# Patient Record
Sex: MALE | Race: WHITE
[De-identification: names, ages, dates, MRNs, and addresses within clinical notes are randomized per-mention and may not be internally consistent; named-entity substitution may affect disease eponyms.]

---

## 2017-05-19 ENCOUNTER — HOSPITAL ENCOUNTER (EMERGENCY)
Dept: HOSPITAL 61 - ER | Age: 67
Discharge: HOME | End: 2017-05-19
Payer: COMMERCIAL

## 2017-05-19 VITALS
DIASTOLIC BLOOD PRESSURE: 89 MMHG | SYSTOLIC BLOOD PRESSURE: 140 MMHG | SYSTOLIC BLOOD PRESSURE: 140 MMHG | DIASTOLIC BLOOD PRESSURE: 89 MMHG | DIASTOLIC BLOOD PRESSURE: 89 MMHG | SYSTOLIC BLOOD PRESSURE: 140 MMHG

## 2017-05-19 DIAGNOSIS — E11.9: ICD-10-CM
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
## 2017-05-19 DIAGNOSIS — I10: ICD-10-CM

## 2017-05-19 DIAGNOSIS — M25.551: Primary | ICD-10-CM

## 2017-05-19 DIAGNOSIS — M19.90: ICD-10-CM
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
## 2017-05-19 PROCEDURE — 73502 X-RAY EXAM HIP UNI 2-3 VIEWS: CPT

## 2017-05-19 NOTE — PHYS DOC
Past Medical History


Past Medical History:  Arthritis, Cancer, Diabetes-Type II, Hypertension


Additional Past Medical Histor:  LEFT MASTECTOMY


Past Surgical History:  Cholecystectomy


Additional Past Surgical Histo:  LEFT ELBOW SURGERY


Alcohol Use:  None


Drug Use:  None





Adult General


Chief Complaint


Chief Complaint:  MECHANICAL FALL





HPI


HPI





Patient is a 66  year old  male who presents with right hip pain. Last 

night he tripped over a sprayer and landed on his right hip. He states his been 

able to ambulate makes it difficult for him to flex at his hip. He denies 

fevers chills nausea or vomiting. He has pain meds at home of oxycodone and 

just states he wants to know if his hip is broken. Eyes any other injuries such 

as headache, neck pain, chest pain or abdominal pain.





Review of Systems


Review of Systems





Constitutional: Denies fever or chills []


Eyes: Denies change in visual acuity, redness, or eye pain []


HENT: Denies nasal congestion or sore throat []


Respiratory: Denies cough or shortness of breath []


Cardiovascular: No additional information not addressed in HPI []


GI: Denies abdominal pain, nausea, vomiting, bloody stools or diarrhea []


: Denies dysuria or hematuria []


Musculoskeletal: Denies back pain, pain in the right hip


Integument: Denies rash or skin lesions []


Neurologic: Denies headache, focal weakness or sensory changes []


Endocrine: Denies polyuria or polydipsia []





Allergies


Allergies





Allergies








Coded Allergies Type Severity Reaction Last Updated Verified


 


  No Known Drug Allergies    16 No











Physical Exam


Physical Exam





Constitutional: Well developed, well nourished, no acute distress, non-toxic 

appearance. []


HENT: Normocephalic, atraumatic, bilateral external ears normal, oropharynx 

moist, no oral exudates, nose normal. []


Eyes: PERRLA, EOMI, conjunctiva normal, no discharge. [] 


Neck: Normal range of motion, no tenderness, supple, no stridor. [] 


Cardiovascular:Heart rate regular rhythm, no murmur []


Lungs & Thorax:  Bilateral breath sounds clear to auscultation []


Abdomen: Bowel sounds normal, soft, no tenderness, no masses, no pulsatile 

masses. [] 


Skin: Warm, dry, no erythema, no rash. [] 


Back: No tenderness, no CVA tenderness. [] 


Extremities: Tender to palpation the posterior right hip without any step-offs, 

able to flex and extend with minimal discomfort of the right hip, no cyanosis, 

no clubbing, ROM intact, no edema left hip nontender full range of motion, 

dorsal pedis pulse intact on the right lower show many. [] 


Neurologic: Alert and oriented X 3, normal motor function, normal sensory 

function, no focal deficits noted. []


Psychologic: Affect normal, judgement normal, mood normal. []





Current Patient Data


Vital Signs





 Vital Signs








  Date Time  Temp Pulse Resp B/P (MAP) Pulse Ox O2 Delivery O2 Flow Rate FiO2


 


17 12:56 98.4 115 20  95 Room Air  





 98.4       











EKG


EKG


[]





Radiology/Procedures


Radiology/Procedures


 [facility]


 [address] [place] [address]


 [phone]


 


 IMAGING REPORT





 Signed





PATIENT: MARKIE FRANCO ACCOUNT: [id number] MRN#: [id number]


: 1950 LOCATION: ER AGE: 66


SEX: M EXAM DT: 17 ACCESSION#: [id number]


STATUS: PRE ER ORD. PHYSICIAN: KATARINA DAWN MD 


REASON: pain


PROCEDURE: HIP RIGHT 2V WITH PELVIS





Pelvis with right hip, 3 views, 2017:





History: Fall, hip pain





No fracture or dislocation is identified. The hip joint spaces are fairly


well-preserved with only mild marginal spurring. Arterial calcifications are


present.





IMPRESSION: No acute bony abnormality is detected.














DICTATED and SIGNED BY:     MORITZ,RICK S MD


DATE:     17 6548





CC: KATARINA DAWN MD; ROSA ELENA VIEYRA Jr, MD ~


Impressions:


Right hip contusion





Course & Med Decision Making


Course & Med Decision Making


Pertinent Labs and Imaging studies reviewed. (See chart for details)





X-rays do not show any fractures of the pelvis or hip. Patient has pain meds at 

home and crutches. He is being discharged in stable condition to follow up with 

primary care physician if his pain does not resolve over the next week. Return 

precautions given and is agreeable to the plan and being discharged in stable 

condition.





Dragon Disclaimer


Dragon Disclaimer


This electronic medical record was generated, in whole or in part, using a 

voice recognition dictation system.





Departure


Departure


Impression:  


 Primary Impression:  


 Hip pain


Disposition:  01 HOME, SELF-CARE


Condition:  STABLE


Referrals:  


ROSA ELENA VIEYRA Jr, MD (PCP)


Patient Instructions:  Contusion, Easy-to-Read





Additional Instructions:  


The x-rays of your hip did not show anything broken. It's likely contused. It 

will be sore for the next few days. Follow-up with her primary care physician 

within the next week if you're still in pain or discomfort. There is severe 

pain or discomfort please return back to ER.











KATARINA DAWN MD May 19, 2017 13:04

## 2017-05-19 NOTE — RAD
Pelvis with right hip, 3 views, 5/19/2017:



History: Fall, hip pain



No fracture or dislocation is identified. The hip joint spaces are fairly

well-preserved with only mild marginal spurring. Arterial calcifications are

present.



IMPRESSION: No acute bony abnormality is detected.

## 2019-06-05 ENCOUNTER — HOSPITAL ENCOUNTER (OUTPATIENT)
Dept: HOSPITAL 61 - MRI | Age: 69
Discharge: HOME | End: 2019-06-05
Attending: PHYSICAL MEDICINE & REHABILITATION
Payer: MEDICARE

## 2019-06-05 DIAGNOSIS — M89.38: ICD-10-CM

## 2019-06-05 DIAGNOSIS — M48.061: ICD-10-CM

## 2019-06-05 DIAGNOSIS — D18.09: ICD-10-CM

## 2019-06-05 DIAGNOSIS — M51.16: Primary | ICD-10-CM

## 2019-06-05 DIAGNOSIS — M47.27: ICD-10-CM

## 2019-06-05 LAB
BUN SERPL-MCNC: 22 MG/DL (ref 8–26)
CREAT SERPL-MCNC: 1.4 MG/DL (ref 0.7–1.3)
GFR SERPLBLD BASED ON 1.73 SQ M-ARVRAT: 50.1 ML/MIN

## 2019-06-05 PROCEDURE — 36415 COLL VENOUS BLD VENIPUNCTURE: CPT

## 2019-06-05 PROCEDURE — 84520 ASSAY OF UREA NITROGEN: CPT

## 2019-06-05 PROCEDURE — 82565 ASSAY OF CREATININE: CPT

## 2019-06-05 PROCEDURE — 72158 MRI LUMBAR SPINE W/O & W/DYE: CPT

## 2019-06-05 NOTE — RAD
MRI of the lumbar spine without and with contrast 6/5/2019

 

CLINICAL HISTORY: Low back pain with worsening right leg pain. History of 

breast cancer.

 

TECHNIQUE: Unenhanced T1-weighted and T2-weighted sagittal and axial 

recovery sagittal images the lumbar spine were obtained. After the 

intravenous administration of 21 cc of Dotarem, enhanced T1-weighted 

sagittal and axial images of the lumbar spine were obtained.

 

FINDINGS: Very mild S-shaped curvature of the thoracolumbar spine is seen.

Degenerative signal changes are seen involving all the disks of the lumbar

spine. Degenerative signal changes are seen within the marrow surrounding 

these discs. Loss of height of the L3-4 disc is noted. Several hemangiomas

are seen scattered throughout the visualized thoracic and lumbar vertebral

bodies. These measure 3 mm to 6 mm in size. The conus medullaris is within

normal limits in morphology, position, and signal characteristics.

 

No area of abnormal signal intensity is seen to suggest evidence of 

metastatic disease involving the visualized thoracic and lumbosacral 

vertebrae. No area of abnormal contrast enhancement is seen.

 

At the L1-2 disc space there is a mild to moderate generalized disc bulge.

Superimposed on this disc bulge is a left lateral focal disc protrusion. 

This measures 4 mm in AP diameter. Degenerative changes are seen involving

the facet joints bilaterally. There is mild ligamentum flavum hypertrophy 

bilaterally. These findings when combined do not result in significant 

central spinal canal or neural foraminal stenosis.

 

At the L2-3 disc space there is a mild generalized disc bulge. 

Degenerative changes are seen involving the facet joints bilaterally. 

There is mild ligamentum flavum hypertrophy bilaterally. These findings 

when combined do not result in significant central spinal canal or neural 

foraminal stenosis.

 

At the L3-4 disc space there is a moderate generalized disc bulge. This is

eccentric to the left. Superimposed on this disc bulge is a left lateral 

focal disc protrusion. This measures 9 mm in AP diameter. Degenerative 

changes are seen involving the facet joints bilaterally. There is moderate

ligamentum flavum hypertrophy bilaterally. These findings when combined 

result in mild to moderate central spinal canal stenosis. Moderate left 

neural foraminal stenosis is seen. The right neural foramen is patent.

 

At the L4-5 disc space there is a mild generalized disc bulge. 

Degenerative changes are seen involving the facet joints bilaterally. 

There is mild ligamentum flavum hypertrophy bilaterally. These findings do

not result in significant central spinal canal or neural foraminal 

stenosis.

 

At the L5-S1 disc space there is a mild generalized disc bulge. 

Degenerative changes are seen involving the facet joints bilaterally. 

These findings when combined do not result in significant central spinal 

canal or neural foraminal stenosis.

 

IMPRESSION: The changes of degenerative disc disease are seen throughout 

the lumbar spine. These findings result in mild to moderate central spinal

canal stenosis and moderate left neural foraminal stenosis at L3-4.

 

 

 

Electronically signed by: Mauri Mesa MD (6/5/2019 2:35 PM) [facility]

## 2019-07-22 ENCOUNTER — HOSPITAL ENCOUNTER (OUTPATIENT)
Dept: HOSPITAL 61 - CT | Age: 69
Discharge: HOME | End: 2019-07-22
Attending: ORTHOPAEDIC SURGERY
Payer: MEDICARE

## 2019-07-22 DIAGNOSIS — D73.89: ICD-10-CM

## 2019-07-22 DIAGNOSIS — I70.0: ICD-10-CM

## 2019-07-22 DIAGNOSIS — I70.8: ICD-10-CM

## 2019-07-22 DIAGNOSIS — M47.26: Primary | ICD-10-CM

## 2019-07-22 DIAGNOSIS — M48.061: ICD-10-CM

## 2019-07-22 PROCEDURE — 72131 CT LUMBAR SPINE W/O DYE: CPT

## 2019-07-22 NOTE — RAD
CT LUMBAR SPINE WO CONTRAST 

 

Date: 7/22/2019 3:30 PM 

 

Indication:

 

Comparison:  MRI 6/5/2019. 

 

Technique:  Helical CT images of the lumbar spine were obtained without 

contrast. Coronal and sagittal reformatted images were also performed. One

or more of the following dose reduction techniques were utilized: 

Automated exposure control (AEC), Adjustment of mA and/or kV according to 

patient size, Use of iterative reconstruction technique such as ASiR, CT 

scan done according to ALARA and image gently/image wisely.

 

Findings: 

 

The lumbar spine is normally aligned. No acute fracture. Vertebral body 

heights are maintained without compression deformity. No aggressive lytic 

or blastic osseous lesion.

 

Moderate multilevel degenerative disc space height loss. Multilevel spinal

canal stenosis and neuroforaminal narrowing, best characterized on recent 

MRI L-spine report. Multilevel mild and moderate facet arthrosis.  

Bilateral L5 pars defects.

 

Calcified splenic granulomas. The visualized abdominal aorta is normal 

caliber. Moderate aortoiliac atherosclerotic disease.

 

IMPRESSION:

 

Moderate lumbar spondylosis. Multilevel spinal canal stenosis or neural 

foraminal narrowing, best characterized on recent MRI lumbar spine port.

 

Electronically signed by: Earle Gonzalez MD (7/22/2019 5:32 PM) 

[facility]

## 2019-11-01 ENCOUNTER — HOSPITAL ENCOUNTER (EMERGENCY)
Dept: HOSPITAL 61 - ER | Age: 69
Discharge: HOME | End: 2019-11-01
Payer: MEDICARE

## 2019-11-01 VITALS
DIASTOLIC BLOOD PRESSURE: 76 MMHG | DIASTOLIC BLOOD PRESSURE: 76 MMHG | SYSTOLIC BLOOD PRESSURE: 156 MMHG | SYSTOLIC BLOOD PRESSURE: 156 MMHG | DIASTOLIC BLOOD PRESSURE: 76 MMHG | DIASTOLIC BLOOD PRESSURE: 76 MMHG | SYSTOLIC BLOOD PRESSURE: 156 MMHG | SYSTOLIC BLOOD PRESSURE: 156 MMHG

## 2019-11-01 VITALS — WEIGHT: 240 LBS | BODY MASS INDEX: 33.6 KG/M2 | HEIGHT: 71 IN

## 2019-11-01 DIAGNOSIS — M19.90: ICD-10-CM

## 2019-11-01 DIAGNOSIS — E11.65: Primary | ICD-10-CM

## 2019-11-01 DIAGNOSIS — I10: ICD-10-CM

## 2019-11-01 LAB
ALBUMIN SERPL-MCNC: 3.3 G/DL (ref 3.4–5)
ALBUMIN/GLOB SERPL: 0.9 {RATIO} (ref 1–1.7)
ALP SERPL-CCNC: 60 U/L (ref 46–116)
ALT SERPL-CCNC: 33 U/L (ref 16–63)
ANION GAP SERPL CALC-SCNC: 12 MMOL/L (ref 6–14)
APTT PPP: YELLOW S
AST SERPL-CCNC: 39 U/L (ref 15–37)
BACTERIA #/AREA URNS HPF: 0 /HPF
BASOPHILS # BLD AUTO: 0.1 X10^3/UL (ref 0–0.2)
BASOPHILS NFR BLD: 1 % (ref 0–3)
BILIRUB SERPL-MCNC: 0.4 MG/DL (ref 0.2–1)
BILIRUB UR QL STRIP: NEGATIVE
BUN SERPL-MCNC: 25 MG/DL (ref 8–26)
BUN/CREAT SERPL: 16 (ref 6–20)
CALCIUM SERPL-MCNC: 8.5 MG/DL (ref 8.5–10.1)
CHLORIDE SERPL-SCNC: 101 MMOL/L (ref 98–107)
CO2 SERPL-SCNC: 25 MMOL/L (ref 21–32)
CREAT SERPL-MCNC: 1.6 MG/DL (ref 0.7–1.3)
EOSINOPHIL NFR BLD: 0.2 X10^3/UL (ref 0–0.7)
EOSINOPHIL NFR BLD: 3 % (ref 0–3)
ERYTHROCYTE [DISTWIDTH] IN BLOOD BY AUTOMATED COUNT: 13.3 % (ref 11.5–14.5)
FIBRINOGEN PPP-MCNC: CLEAR MG/DL
GFR SERPLBLD BASED ON 1.73 SQ M-ARVRAT: 42.8 ML/MIN
GLOBULIN SER-MCNC: 3.8 G/DL (ref 2.2–3.8)
GLUCOSE SERPL-MCNC: 399 MG/DL (ref 70–99)
HCT VFR BLD CALC: 38.1 % (ref 39–53)
HGB BLD-MCNC: 12.9 G/DL (ref 13–17.5)
LYMPHOCYTES # BLD: 2.6 X10^3/UL (ref 1–4.8)
LYMPHOCYTES NFR BLD AUTO: 33 % (ref 24–48)
MCH RBC QN AUTO: 31 PG (ref 25–35)
MCHC RBC AUTO-ENTMCNC: 34 G/DL (ref 31–37)
MCV RBC AUTO: 91 FL (ref 79–100)
MONO #: 0.5 X10^3/UL (ref 0–1.1)
MONOCYTES NFR BLD: 6 % (ref 0–9)
NEUT #: 4.5 X10^3/UL (ref 1.8–7.7)
NEUTROPHILS NFR BLD AUTO: 57 % (ref 31–73)
NITRITE UR QL STRIP: NEGATIVE
PH UR STRIP: 5.5 [PH]
PLATELET # BLD AUTO: 270 X10^3/UL (ref 140–400)
POTASSIUM SERPL-SCNC: 4.4 MMOL/L (ref 3.5–5.1)
PROT SERPL-MCNC: 7.1 G/DL (ref 6.4–8.2)
PROT UR STRIP-MCNC: NEGATIVE MG/DL
RBC # BLD AUTO: 4.17 X10^6/UL (ref 4.3–5.7)
RBC #/AREA URNS HPF: 0 /HPF (ref 0–2)
SODIUM SERPL-SCNC: 138 MMOL/L (ref 136–145)
SQUAMOUS #/AREA URNS LPF: (no result) /LPF
UROBILINOGEN UR-MCNC: 1 MG/DL
WBC # BLD AUTO: 8 X10^3/UL (ref 4–11)
WBC #/AREA URNS HPF: (no result) /HPF (ref 0–4)

## 2019-11-01 PROCEDURE — 80053 COMPREHEN METABOLIC PANEL: CPT

## 2019-11-01 PROCEDURE — 36415 COLL VENOUS BLD VENIPUNCTURE: CPT

## 2019-11-01 PROCEDURE — 96361 HYDRATE IV INFUSION ADD-ON: CPT

## 2019-11-01 PROCEDURE — 82962 GLUCOSE BLOOD TEST: CPT

## 2019-11-01 PROCEDURE — 93005 ELECTROCARDIOGRAM TRACING: CPT

## 2019-11-01 PROCEDURE — 81001 URINALYSIS AUTO W/SCOPE: CPT

## 2019-11-01 PROCEDURE — 99285 EMERGENCY DEPT VISIT HI MDM: CPT

## 2019-11-01 PROCEDURE — 96360 HYDRATION IV INFUSION INIT: CPT

## 2019-11-01 PROCEDURE — 85025 COMPLETE CBC W/AUTO DIFF WBC: CPT

## 2019-11-01 PROCEDURE — 71046 X-RAY EXAM CHEST 2 VIEWS: CPT

## 2019-11-01 NOTE — PHYS DOC
Past Medical History


Past Medical History:  Arthritis, Cancer, Diabetes-Type II, Hypertension


Additional Past Medical Histor:  LEFT MASTECTOMY


Past Surgical History:  Cholecystectomy


Additional Past Surgical Histo:  LEFT ELBOW SURGERY


Alcohol Use:  None


Drug Use:  None





Adult General


Chief Complaint


Chief Complaint:  HYPERGLYCEMIA





HPI


HPI





Patient is a 69  year old male who presents with high blood sugar. The patient 

states he checked his blood sugar about 4:00 and it was 540 at home and so took 

15 units of subcutaneous regular insulin. The patient states that been coughing 

the last week or 2. He states that his blood sugars normally run in the 200s. He

did state that he has been taking less insulin as he has been trying to save 

money. Denies any pain or fever this time. States he has been feeling off 

recently.





Review of Systems


Review of Systems





Constitutional: Denies fever or chills []


Eyes: Denies change in visual acuity, redness, or eye pain []


HENT: Denies nasal congestion or sore throat []


Respiratory: Reports cough


Cardiovascular: No additional information not addressed in HPI []


GI: Denies abdominal pain, nausea, vomiting, bloody stools or diarrhea []


: Denies dysuria or hematuria []


Musculoskeletal: Denies back pain or joint pain []


Integument: Denies rash or skin lesions []


Neurologic: Denies headache, focal weakness or sensory changes []


Endocrine: Denies polyuria or polydipsia []





Complete systems were reviewed and found to be within normal limits, except as 

documented in this note.





Current Medications


Current Medications





Current Medications








 Medications


  (Trade)  Dose


 Ordered  Sig/Becky  Start Time


 Stop Time Status Last Admin


Dose Admin


 


 Sodium Chloride  500 ml @ 


 500 mls/hr  1X  ONCE  19 18:00


 19 18:59  19 18:00


500 MLS/HR











Allergies


Allergies





Allergies








Coded Allergies Type Severity Reaction Last Updated Verified


 


  No Known Drug Allergies    16 No











Physical Exam


Physical Exam





Constitutional: Well developed, well nourished, no acute distress, non-toxic 

appearance. []


HENT: Normocephalic, atraumatic, bilateral external ears normal, oropharynx 

moist, no oral exudates, nose normal. []


Eyes: PERRLA, EOMI, conjunctiva normal, no discharge. [] 


Neck: Normal range of motion, no tenderness, supple, no stridor. [] 


Cardiovascular:Heart rate regular rhythm, no murmur []


Lungs & Thorax:  Bilateral breath sounds diminished to based bilaterally, 

otherwise clear.


Abdomen: Bowel sounds normal, soft, no tenderness, no masses, no pulsatile 

masses. [] 


Skin: Warm, dry, no erythema, no rash. [] 


Back: No tenderness, no CVA tenderness. [] 


Extremities: No tenderness, no cyanosis, no clubbing, ROM intact, no edema. [] 


Neurologic: Alert and oriented X 3, normal motor function, normal sensory 

function, no focal deficits noted. []


Psychologic: Affect normal, judgement normal, mood normal. []





Current Patient Data


Vital Signs





                                   Vital Signs








  Date Time  Temp Pulse Resp B/P (MAP) Pulse Ox O2 Delivery O2 Flow Rate FiO2


 


19 16:18 97.2 102 12 160/86 (110) 97 Room Air  





 97.2       








Lab Values





                                Laboratory Tests








Test


 19


16:28 19


17:00 19


17:04


 


Glucose (Fingerstick)


 399 mg/dL


(70-99)  H 


 





 


White Blood Count


 


 8.0 x10^3/uL


(4.0-11.0) 





 


Red Blood Count


 


 4.17 x10^6/uL


(4.30-5.70)  L 





 


Hemoglobin


 


 12.9 g/dL


(13.0-17.5)  L 





 


Hematocrit


 


 38.1 %


(39.0-53.0)  L 





 


Mean Corpuscular Volume


 


 91 fL ()


 





 


Mean Corpuscular Hemoglobin  31 pg (25-35)   


 


Mean Corpuscular Hemoglobin


Concent 


 34 g/dL


(31-37) 





 


Red Cell Distribution Width


 


 13.3 %


(11.5-14.5) 





 


Platelet Count


 


 270 x10^3/uL


(140-400) 





 


Neutrophils (%) (Auto)  57 % (31-73)   


 


Lymphocytes (%) (Auto)  33 % (24-48)   


 


Monocytes (%) (Auto)  6 % (0-9)   


 


Eosinophils (%) (Auto)  3 % (0-3)   


 


Basophils (%) (Auto)  1 % (0-3)   


 


Neutrophils # (Auto)


 


 4.5 x10^3/uL


(1.8-7.7) 





 


Lymphocytes # (Auto)


 


 2.6 x10^3/uL


(1.0-4.8) 





 


Monocytes # (Auto)


 


 0.5 x10^3/uL


(0.0-1.1) 





 


Eosinophils # (Auto)


 


 0.2 x10^3/uL


(0.0-0.7) 





 


Basophils # (Auto)


 


 0.1 x10^3/uL


(0.0-0.2) 





 


Sodium Level


 


 138 mmol/L


(136-145) 





 


Potassium Level


 


 4.4 mmol/L


(3.5-5.1) 





 


Chloride Level


 


 101 mmol/L


() 





 


Carbon Dioxide Level


 


 25 mmol/L


(21-32) 





 


Anion Gap  12 (6-14)   


 


Blood Urea Nitrogen


 


 25 mg/dL


(8-26) 





 


Creatinine


 


 1.6 mg/dL


(0.7-1.3)  H 





 


Estimated GFR


(Cockcroft-Gault) 


 42.8  


 





 


BUN/Creatinine Ratio  16 (6-20)   


 


Glucose Level


 


 399 mg/dL


(70-99)  H 





 


Calcium Level


 


 8.5 mg/dL


(8.5-10.1) 





 


Total Bilirubin


 


 0.4 mg/dL


(0.2-1.0) 





 


Aspartate Amino Transferase


(AST) 


 39 U/L (15-37)


H 





 


Alanine Aminotransferase (ALT)


 


 33 U/L (16-63)


 





 


Alkaline Phosphatase


 


 60 U/L


() 





 


Total Protein


 


 7.1 g/dL


(6.4-8.2) 





 


Albumin


 


 3.3 g/dL


(3.4-5.0)  L 





 


Albumin/Globulin Ratio


 


 0.9 (1.0-1.7)


L 





 


Urine Collection Type   Void  


 


Urine Color   Yellow  


 


Urine Clarity   Clear  


 


Urine pH   5.5  


 


Urine Specific Gravity   >=1.030  


 


Urine Protein


 


 


 Negative mg/dL


(NEG-TRACE)


 


Urine Glucose (UA)


 


 


 >=1000 mg/dL


(NEG)


 


Urine Ketones (Stick)


 


 


 Negative mg/dL


(NEG)


 


Urine Blood


 


 


 Negative (NEG)





 


Urine Nitrite


 


 


 Negative (NEG)





 


Urine Bilirubin


 


 


 Negative (NEG)





 


Urine Urobilinogen Dipstick


 


 


 1.0 mg/dL (0.2


mg/dL)


 


Urine Leukocyte Esterase


 


 


 Negative (NEG)





 


Urine RBC   0 /HPF (0-2)  


 


Urine WBC


 


 


 Occ /HPF (0-4)





 


Urine Squamous Epithelial


Cells 


 


 Few /LPF  





 


Urine Bacteria


 


 


 0 /HPF (0-FEW)








                                Laboratory Tests


19 17:00








                                Laboratory Tests


19 17:00














EKG


EKG


EKG interpreted by Dr. Ash





Sinus with rate of 112. No STEMI, RBBB (not new).





Radiology/Procedures


Radiology/Procedures


[][facility]


                    [address] [place] [address]


                                 [phone]


                                        


                                 IMAGING REPORT





                                     Signed





PATIENT: MARKIE FRANCO  ACCOUNT: [id number]     MRN#: [id number]


: 1950           LOCATION: ER              AGE: 69


SEX: M                    EXAM DT: 19         ACCESSION#: [id number]


STATUS: REG ER            ORD. PHYSICIAN: DU BERKOWITZ


REASON: cough


PROCEDURE: CHEST PA & LATERAL





CHEST PA   LATERAL


 


History: Cough


 


Comparison: None.


 


Findings:


2 views of the chest are submitted. 


There is no infiltrate, pneumothorax, or effusion. Pericardial cardiac 


silhouette is within normal limits in size. There is some atherosclerotic 


calcification near the aortic arch.


 


Impression: 


 


1.  There is no radiographic evidence of acute cardiopulmonary disease.


 


Electronically signed by: Ancelmo Santiago MD (2019 5:32 PM) 


[facility]














DICTATED and SIGNED BY:     ANCELMO SANTIAGO MD


DATE:     19 173





Course & Med Decision Making


Course & Med Decision Making


Pertinent Labs and Imaging studies reviewed. (See chart for details)





Will get labs, ekg, chest x-ray, and give supportive care.





Labs, EKG, and chest x-ray are unremarkable. Will d/c home. Blood sugar is more 

under control.





Dragon Disclaimer


Dragon Disclaimer


This electronic medical record was generated, in whole or in part, using a voice

 recognition dictation system.





Departure


Departure


Impression:  


   Primary Impression:  


   Hyperglycemia


Disposition:   HOME, SELF-CARE


Condition:  STABLE


Referrals:  


NAREN UNDERWOOD MD (PCP)


Patient Instructions:  Hyperglycemia





Additional Instructions:  


Thank you for visiting [facility]. We appreciate you trusting us 

with your care. If any additional problems come up don't hesitate to return to 

visit us. Please follow up with your primary care provider so they can plan 

additional care if needed and know about the problem that you had. If symptoms 

worsen come back to the Emergency Department. Any concerning symptoms that start

 such as chest pain, shortness of air, weakness or numbness on one side of the 

body, running high fevers or any other concerning symptoms return to the ER. 





Please be aware that diabetes can cause your sugars to fluctuate while you are 

sick. This can cause additional issues. Please check your sugars often to ensure

 they are staying in a safe range and if you are on insulin please take as 

instructed by your primary care doctor. If you have any questions about this 

please let us know or contact your primary care provider for additional 

instruction about taking your insulin while you are sick. If you get concerned 

regarding your sugar while at home please do not hesitate to come back to the 

ER.











DU BERKOWITZ           2019 16:45

## 2019-11-01 NOTE — RAD
CHEST PA   LATERAL

 

History: Cough

 

Comparison: None.

 

Findings:

2 views of the chest are submitted. 

There is no infiltrate, pneumothorax, or effusion. Pericardial cardiac 

silhouette is within normal limits in size. There is some atherosclerotic 

calcification near the aortic arch.

 

Impression: 

 

1.  There is no radiographic evidence of acute cardiopulmonary disease.

 

Electronically signed by: Earle Montana MD (11/1/2019 5:32 PM) 

[facility]

## 2019-11-04 NOTE — EKG
[facility]

              [address] [place] [address]

Test Date:    2019               Test Time:    16:58:48

Pat Name:     MARKIE FRANCO            Department:   

Patient ID:   [id number]           Room:          

Gender:       M                        Technician:   

:          1950               Requested By: DU BERKOWITZ

Order Number: [id number]           Reading MD:     

                                 Measurements

Intervals                              Axis          

Rate:         112                      P:            

FL:                                    QRS:          -37

QRSD:         136                      T:            -6

QT:           338                                    

QTc:          463                                    

                           Interpretive Statements

ATRIAL FIB./FLUTTER WITH RAPID VENTRICULAR RESPONSE

ABNORMAL LEFT AXIS DEVIATION

LEFT ANTERIOR FASCICULAR BLOCK

RIGHT BUNDLE BRANCH BLOCK

BIFASCICULAR BLOCK

RVH WITH REPOLARIZATION ABNORMALITY

ABNORMAL ECG

No previous ECG available for comparison

## 2021-04-08 ENCOUNTER — HOSPITAL ENCOUNTER (OUTPATIENT)
Dept: HOSPITAL 61 - KCIC MRI | Age: 71
End: 2021-04-08
Attending: ORTHOPAEDIC SURGERY
Payer: COMMERCIAL

## 2021-04-08 DIAGNOSIS — M47.26: Primary | ICD-10-CM

## 2021-04-08 DIAGNOSIS — M48.061: ICD-10-CM

## 2021-04-08 DIAGNOSIS — M41.86: ICD-10-CM

## 2021-04-08 DIAGNOSIS — M43.16: ICD-10-CM

## 2021-04-08 PROCEDURE — 72148 MRI LUMBAR SPINE W/O DYE: CPT

## 2021-04-08 NOTE — [facility]
EXAMINATION: Magnetic resonance imaging (MRI) of the lumbar spine without contrast 4/8/2021 1:50 PM



HISTORY: Lumbar radiculopathy. Severe low back pain for the last 4 years.



TECHNIQUE: Multiplanar multi-weighted MRI of the lumbar spine was performed without intravenous contr
ast using the standard lumbar spine protocol.



Contrast information:

None administered.



COMPARISON: CT lumbar spine 7/22/2019, MRI lumbar spine 6/5/2019



FINDINGS:



There is minimal anterolisthesis of L3 on L4. Minimal antral C6 as of L5 on S1. There is moderate dis
c height loss at L3-L4 with Modic type I endplate degenerative changes. There is mild disc height los
s at L5-S1 with Modic type I endplate degenerative changes. There is subtle T1 signal hypointensity i
nvolving the superior endplate of L5 with marrow edema. There is prevertebral edema. Disc signal inte
nsity appears to be normal and T2-weighted images. No epidural edema or phlegmon. Mild disc height lo
ss is identified at L2-L3. Conus medullaris terminates at L1. Distal spinal cord signal intensity is 
normal in all sequences. Abdominal aorta is normal in caliber. No suspicious retroperitoneal abnormal
ity is identified.



L1-L2: There is a circumferential disc bulge with central disc protrusion. Mild facet arthropathy. Mo
derate bilateral neuroforaminal stenosis. Mild spinal canal stenosis, exacerbated by epidural lipomat
osis.



L2-L3: There is a circumferential disc bulge. Mild facet arthropathy. There is a central disc extrusi
on extending into the left subarticular recess. Mild facet arthropathy. There is left lateral recess 
stenosis. Moderate left and mild right neuroforaminal stenosis. Mild spinal canal stenosis.



L3-L4: There is a moderate circumferential disc bulge. Moderate facet arthropathy with ligamentum fla
vum infolding. Severe bilateral neuroforaminal stenosis. Moderate spinal canal stenosis.



L4-L5: There is a disc bulge with left central disc protrusion. Mild facet arthropathy. Mild bilatera
l neuroforaminal stenosis. No significant spinal canal stenosis.



L5-S1: There is mild disc bulge asymmetric to the left. Mild facet arthropathy. Mild bilateral neurof
oraminal stenosis. No spinal canal stenosis.



IMPRESSION:



Modic type I endplate degenerative changes are identified at L5-S1 with endplate marrow edema. Linear
 T1 signal hypointensity involving the superior endplate of S1 could represent a fracture line, altho
ugh dense sclerosis may have similar appearance. There is prevertebral edema identified at L5-S1. Con
sideration may be given for advanced discogenic changes at this level. No disc signal alteration to s
uggest discitis osteomyelitis. No definite osseous erosion by MRI. Further evaluation with CT lumbar 
spine could be of benefit for further evaluation.



Moderate degenerative changes of lumbar spine, as described in detail above.





**********FOR INTERNAL CODING PURPOSES**********



Critical result:



Findings conveyed in voicemail to nurse Cherrie for Dr. Ochoa at 4/8/2021 4:02 PM.



RESULT CODE: (C)  



  







Electronically signed by: Ana M Carolina MD (4/8/2021 4:04 PM) [id number]

## 2021-04-19 ENCOUNTER — HOSPITAL ENCOUNTER (OUTPATIENT)
Dept: HOSPITAL 61 - KCIC CT | Age: 71
End: 2021-04-19
Attending: ORTHOPAEDIC SURGERY
Payer: MEDICARE

## 2021-04-19 DIAGNOSIS — M48.07: ICD-10-CM

## 2021-04-19 DIAGNOSIS — M47.27: Primary | ICD-10-CM

## 2021-04-19 PROCEDURE — 72131 CT LUMBAR SPINE W/O DYE: CPT

## 2021-04-19 NOTE — [facility]
EXAM: Lumbar spine CT without contrast.



HISTORY: Chronic lower back pain. Right lower extremity radiculopathy. 



TECHNIQUE: Computed tomographic images of the lumbar spine were obtained without contrast. Multiplana
r reformatting was performed.



*One or more of the following individualized dose reduction techniques were utilized for this examina
tion:  

1. Automated exposure control.  

2. Adjustment of the mA and/or kV according to patient size.  

3. Use of iterative reconstruction technique.



COMPARISON: MRI dated 4/8/2021.



FINDINGS: There is S-shaped lumbar scoliosis with dextrocurvature centered at L2-L3 and levocurvature
 centered at L4-L5. There is rightward lateral translation of L3 on L4. There is grade 1 anterolisthe
sis of L5 on S1, measuring 3 mm. There are bilateral pars interarticularis defects this level. There 
is 2 mm retrolisthesis of L1 on L2 and L2 on L3.



There is degenerative endplate remodeling with disc space narrowing and osteophytosis primarily along
 the left aspect of L3-L4. This corresponds with the level of maximum scoliotic concavity.



There are few endplate Schmorl's nodes. There is a vacuum phenomenon at multiple levels. No acute or 
subacute fracture is seen. Specifically, there is no suspicious finding to correlate with linear sign
al abnormality within the superior endplate of S1. There is degenerative subchondral sclerosis and va
cuum phenomenon involving the sacroiliac joints. There is aortobiiliac atherosclerosis.



At L1-L2, there is a right lateral predominant disc bulge and endplate remodeling. There is mild righ
t greater than left facet arthropathy. There is slight retrolisthesis. There is moderate bilateral fo
raminal stenosis. There is mild central canal stenosis.



At L2-L3, there is a left lateral recess disc protrusion with slight inferior extrusion superimposed 
on a disc bulge and endplate osteophytosis. There is mild bilateral facet arthropathy. There is moder
ate left greater than right foraminal stenosis. There is moderate central canal stenosis and narrowin
g of the left lateral recess.



At L3-L4, there is a left lateral recess to extra foraminal disc protrusion and osteophyte complex vidales
perimposed on a left lateral predominant disc bulge and endplate osteophytosis. There is moderate rig
ht and severe left facet arthropathy. There is lateral right and severe left foraminal stenosis. Ther
e is moderate central canal stenosis.



At L4-L5, there is a disc bulge and endplate remodeling. There is moderate bilateral facet arthropath
y. There is mild to moderate right and moderate left foraminal stenosis. There is mild central canal 
stenosis.



At L5-S1, there is a left foraminal to extra foraminal disc protrusion superimposed on a right latera
l predominant disc bulge and endplate osteophytosis. There is grade 1 anterolisthesis with pars defec
ts. There is mild left facet arthropathy. There is moderate to severe right and moderate left foramin
al stenosis. 



IMPRESSION: 

1. Multilevel degenerative change involving the lumbar spine and lower thoracic spine, described in d
etail above. This results in stenosis of aforementioned levels.

2. Grade 1 anterolisthesis with bilateral pars defects at L5-S1. The superimposed on S-shaped scolios
is and slight lateral translation of L3 on L4 and slight retrolisthesis at the upper lumbar levels.

3. No acute osseous finding.. Note is made that the aforementioned degenerative changes have progress
ed at L3-L4 and L5-S1 compared to the prior CT performed 7/22/2019. The degenerative changes are fair
ly similar compared to the prior exam at the remainder of the lumbar levels.



Electronically signed by: Cherri Sawyer MD (4/19/2021 3:44 PM) [id number]

## 2021-08-26 ENCOUNTER — HOSPITAL ENCOUNTER (OUTPATIENT)
Dept: HOSPITAL 61 - ECHO | Age: 71
End: 2021-08-26
Attending: INTERNAL MEDICINE
Payer: COMMERCIAL

## 2021-08-26 DIAGNOSIS — I51.7: Primary | ICD-10-CM

## 2021-08-26 DIAGNOSIS — I10: ICD-10-CM

## 2021-08-26 DIAGNOSIS — E11.9: ICD-10-CM

## 2021-08-26 DIAGNOSIS — E78.5: ICD-10-CM

## 2021-08-26 PROCEDURE — 93306 TTE W/DOPPLER COMPLETE: CPT

## 2021-08-26 NOTE — CARD
MR#: [id number]

Account#: [id number]

Accession#: [id number]

Date of Study: 08/26/2021

Ordering Physician: LINDSAY CULP, 

Referring Physician: LINDSAY CULP, 

Tech: Nesha Morgan [facility]





--------------- APPROVED REPORT --------------





EXAM: Two-dimensional and M-mode echocardiogram with Doppler and color Doppler.



Other Information 

Quality : AverageHR: 99bpm



INDICATION

Abnormal ECG 



RISK FACTORS

Hypertension 

Hyperlipidemia

Diabetes



2D DIMENSIONS 

RVDd3.3 (2.9-3.5cm)Left Atrium(2D)3.7 (1.6-4.0cm)

IVSd1.1 (0.7-1.1cm)Aortic Root(2D)3.4 (2.0-3.7cm)

LVDd5.1 (3.9-5.9cm)LVOT Diameter2.2 (1.8-2.4cm)

PWd1.1 (0.7-1.1cm)LVDs4.0 (2.5-4.0cm)

FS (%) 20.9 %SV52.9 ml

LVEF(%)42.3 (>50%)



Aortic Valve

AoV Peak Tomas.116.6cm/sAoV VTI20.8cm

AO Peak GR.5.4mmHgLVOT Peak Tomas.69.8cm/s

LVOT  VTI 13.41cmAO Mean GR.3mmHg

SABINA (VMAX)1.76cj6CPE   (VTI)2.40cm2



Mitral Valve

MV E Sbgqrtsn49.7cm/sMV DECEL [id number]

MV A Sordsgle52.3cm/sMV E Mean Gr.2mmHg

MV SHJ20wdC/A  Ratio1.1

MVA (PHT)5.49cm2



TDI

E/Lateral E'5.9E/Medial E'4.2



Pulmonary Valve

PV Peak Zvldocvy703.7cm/sPV Peak Grad.4mmHg



Tricuspid Valve

TR P. Xlgrrfjl021ol/sRAP [id number]

TR Peak Gr.98vlCmMOKX34yvVx



Pulmonary Vein

S1 Qzzucnes01.4cm/sD2 Xsmuznrj84.9cm/s

PVa [id number]



 LEFT VENTRICLE 

The left ventricle is normal size. There is mild concentric left ventricular hypertrophy. The left ve
ntricular systolic function is normal and the ejection fraction is within normal range. The Ejection 
Fraction is 50-55%. There is normal LV segmental wall motion. Tissue Doppler imaging reveals mild lef
t ventricular diastolic dysfunction.



 RIGHT VENTRICLE 

The right ventricle is normal size. There is normal right ventricular wall thickness. The right ventr
icular systolic function is normal.



 ATRIA 

The left atrium size is normal. The right atrium size is normal. The interatrial septum is intact wit
h no evidence for an atrial septal defect or patent foramen ovale as noted on 2-D or Doppler imaging.




 AORTIC VALVE 

The aortic valve is normal in structure and function. Doppler and Color Flow revealed no significant 
aortic regurgitation. There is no significant aortic valvular stenosis. Calculated aortic valve area 
is 2.9 cm2 with maximum pressure gradient of 8 mmHg and mean pressure gradient of 4 mmHg.



 MITRAL VALVE 

The mitral valve is normal in structure and function. There is no evidence of mitral valve prolapse. 
There is no mitral valve stenosis. Doppler and Color-flow revealed trace mitral regurgitation.



 TRICUSPID VALVE 

The tricuspid valve is normal in structure and function. Doppler and Color Flow revealed trace tricus
pid regurgitation with an estimated PAP of 28 mmHg. There is no tricuspid valve stenosis.



 PULMONIC VALVE 

Doppler and Color Flow revealed trace pulmonic valvular regurgitation. There is no pulmonic valvular 
stenosis.



 GREAT VESSELS 

The aortic root is normal in size. The ascending aorta is normal in size. The IVC is normal in size a
nd collapses >50% with inspiration.



 PERICARDIAL EFFUSION 

There is no evidence of significant pericardial effusion.



Critical Notification

Critical Value: No



<Conclusion>

The left ventricular systolic function is normal and the ejection fraction is within normal range. Th
e Ejection Fraction is 50-55%.

There is normal LV segmental wall motion.

Doppler and Color Flow revealed trace tricuspid regurgitation with an estimated PAP of 28 mmHg.



Signed by : Segun Urbina, 

Electronically Approved : 08/26/2021 16:47:10